# Patient Record
Sex: FEMALE | Race: WHITE | HISPANIC OR LATINO | Employment: FULL TIME | URBAN - METROPOLITAN AREA
[De-identification: names, ages, dates, MRNs, and addresses within clinical notes are randomized per-mention and may not be internally consistent; named-entity substitution may affect disease eponyms.]

---

## 2018-01-26 NOTE — TELEPHONE ENCOUNTER
----- Message from Franchesca Vgiil sent at 1/26/2018  3:14 PM CST -----  [facility] a [facility] 40[facility]. Contact number [phone].

## 2018-01-27 RX ORDER — PANTOPRAZOLE SODIUM 40 MG/1
40 TABLET, DELAYED RELEASE ORAL DAILY
Qty: 30 TABLET | Refills: 3 | Status: SHIPPED | OUTPATIENT
Start: 2018-01-27 | End: 2018-06-03 | Stop reason: SDUPTHER

## 2018-06-01 ENCOUNTER — TELEPHONE (OUTPATIENT)
Dept: NEUROLOGY | Facility: HOSPITAL | Age: 53
End: 2018-06-01

## 2018-06-04 RX ORDER — PANTOPRAZOLE SODIUM 40 MG/1
40 TABLET, DELAYED RELEASE ORAL DAILY
Qty: 90 TABLET | Refills: 3 | Status: SHIPPED | OUTPATIENT
Start: 2018-06-04 | End: 2020-05-18

## 2018-06-04 RX ORDER — PANTOPRAZOLE SODIUM 40 MG/1
40 TABLET, DELAYED RELEASE ORAL DAILY
Qty: 30 TABLET | Refills: 3 | Status: SHIPPED | OUTPATIENT
Start: 2018-06-04 | End: 2019-06-04

## 2020-05-18 RX ORDER — PANTOPRAZOLE SODIUM 40 MG/1
TABLET, DELAYED RELEASE ORAL
Qty: 90 TABLET | Refills: 3 | Status: SHIPPED | OUTPATIENT
Start: 2020-05-18 | End: 2021-08-09

## 2020-09-01 ENCOUNTER — CLINICAL SUPPORT (OUTPATIENT)
Dept: URGENT CARE | Facility: CLINIC | Age: 55
End: 2020-09-01
Payer: COMMERCIAL

## 2020-09-01 DIAGNOSIS — Z78.9 NO KNOWN PROBLEMS: Primary | ICD-10-CM

## 2020-09-01 LAB
CTP QC/QA: YES
SARS-COV-2 RDRP RESP QL NAA+PROBE: NEGATIVE

## 2020-09-01 PROCEDURE — U0002 COVID-19 LAB TEST NON-CDC: HCPCS | Mod: S$GLB,,, | Performed by: FAMILY MEDICINE

## 2020-09-01 PROCEDURE — 99201 PR OFFICE/OUTPT VISIT,NEW,LEVL I: ICD-10-PCS | Mod: S$GLB,,, | Performed by: FAMILY MEDICINE

## 2020-09-01 PROCEDURE — U0002: ICD-10-PCS | Mod: S$GLB,,, | Performed by: FAMILY MEDICINE

## 2020-09-01 PROCEDURE — 99201 PR OFFICE/OUTPT VISIT,NEW,LEVL I: CPT | Mod: S$GLB,,, | Performed by: FAMILY MEDICINE

## 2021-04-16 ENCOUNTER — PATIENT MESSAGE (OUTPATIENT)
Dept: RESEARCH | Facility: HOSPITAL | Age: 56
End: 2021-04-16

## 2021-05-26 ENCOUNTER — CLINICAL SUPPORT (OUTPATIENT)
Dept: URGENT CARE | Facility: CLINIC | Age: 56
End: 2021-05-26
Payer: COMMERCIAL

## 2021-05-26 DIAGNOSIS — Z11.52 ENCOUNTER FOR SCREENING LABORATORY TESTING FOR COVID-19 VIRUS IN ASYMPTOMATIC PATIENT: Primary | ICD-10-CM

## 2021-05-26 DIAGNOSIS — Z01.812 ENCOUNTER FOR SCREENING LABORATORY TESTING FOR COVID-19 VIRUS IN ASYMPTOMATIC PATIENT: Primary | ICD-10-CM

## 2021-05-26 LAB
CTP QC/QA: YES
SARS-COV-2 RDRP RESP QL NAA+PROBE: NEGATIVE

## 2021-05-26 PROCEDURE — U0002: ICD-10-PCS | Mod: QW,S$GLB,, | Performed by: NURSE PRACTITIONER

## 2021-05-26 PROCEDURE — U0002 COVID-19 LAB TEST NON-CDC: HCPCS | Mod: QW,S$GLB,, | Performed by: NURSE PRACTITIONER

## 2022-11-06 ENCOUNTER — OFFICE VISIT (OUTPATIENT)
Dept: URGENT CARE | Facility: CLINIC | Age: 57
End: 2022-11-06
Payer: COMMERCIAL

## 2022-11-06 VITALS
HEIGHT: 64 IN | SYSTOLIC BLOOD PRESSURE: 102 MMHG | HEART RATE: 68 BPM | RESPIRATION RATE: 20 BRPM | BODY MASS INDEX: 22.53 KG/M2 | DIASTOLIC BLOOD PRESSURE: 64 MMHG | TEMPERATURE: 99 F | WEIGHT: 132 LBS | OXYGEN SATURATION: 95 %

## 2022-11-06 DIAGNOSIS — R05.9 COUGH, UNSPECIFIED TYPE: Primary | ICD-10-CM

## 2022-11-06 DIAGNOSIS — U07.1 LAB TEST POSITIVE FOR DETECTION OF COVID-19 VIRUS: ICD-10-CM

## 2022-11-06 LAB
CTP QC/QA: YES
SARS-COV-2 AG RESP QL IA.RAPID: POSITIVE

## 2022-11-06 PROCEDURE — 87811 SARS-COV-2 COVID19 W/OPTIC: CPT | Mod: QW,S$GLB,, | Performed by: FAMILY MEDICINE

## 2022-11-06 PROCEDURE — 1159F MED LIST DOCD IN RCRD: CPT | Mod: CPTII,S$GLB,, | Performed by: FAMILY MEDICINE

## 2022-11-06 PROCEDURE — 3074F PR MOST RECENT SYSTOLIC BLOOD PRESSURE < 130 MM HG: ICD-10-PCS | Mod: CPTII,S$GLB,, | Performed by: FAMILY MEDICINE

## 2022-11-06 PROCEDURE — 99203 OFFICE O/P NEW LOW 30 MIN: CPT | Mod: S$GLB,,, | Performed by: FAMILY MEDICINE

## 2022-11-06 PROCEDURE — 3078F PR MOST RECENT DIASTOLIC BLOOD PRESSURE < 80 MM HG: ICD-10-PCS | Mod: CPTII,S$GLB,, | Performed by: FAMILY MEDICINE

## 2022-11-06 PROCEDURE — 1159F PR MEDICATION LIST DOCUMENTED IN MEDICAL RECORD: ICD-10-PCS | Mod: CPTII,S$GLB,, | Performed by: FAMILY MEDICINE

## 2022-11-06 PROCEDURE — 99203 PR OFFICE/OUTPT VISIT, NEW, LEVL III, 30-44 MIN: ICD-10-PCS | Mod: S$GLB,,, | Performed by: FAMILY MEDICINE

## 2022-11-06 PROCEDURE — 3008F BODY MASS INDEX DOCD: CPT | Mod: CPTII,S$GLB,, | Performed by: FAMILY MEDICINE

## 2022-11-06 PROCEDURE — 87811 SARS CORONAVIRUS 2 ANTIGEN POCT, MANUAL READ: ICD-10-PCS | Mod: QW,S$GLB,, | Performed by: FAMILY MEDICINE

## 2022-11-06 PROCEDURE — 3008F PR BODY MASS INDEX (BMI) DOCUMENTED: ICD-10-PCS | Mod: CPTII,S$GLB,, | Performed by: FAMILY MEDICINE

## 2022-11-06 PROCEDURE — 3074F SYST BP LT 130 MM HG: CPT | Mod: CPTII,S$GLB,, | Performed by: FAMILY MEDICINE

## 2022-11-06 PROCEDURE — 3078F DIAST BP <80 MM HG: CPT | Mod: CPTII,S$GLB,, | Performed by: FAMILY MEDICINE

## 2022-11-06 RX ORDER — BENZONATATE 100 MG/1
200 CAPSULE ORAL 3 TIMES DAILY PRN
Qty: 30 CAPSULE | Refills: 1 | Status: SHIPPED | OUTPATIENT
Start: 2022-11-06

## 2022-11-06 RX ORDER — LORATADINE 10 MG/1
10 TABLET ORAL DAILY
Qty: 30 TABLET | Refills: 2 | Status: SHIPPED | OUTPATIENT
Start: 2022-11-06 | End: 2023-11-06

## 2022-11-06 NOTE — PATIENT INSTRUCTIONS
Your test was POSITIVE for COVID-19 (coronavirus).       Please isolate yourself at home.  You may leave home and/or return to work once the following conditions are met:    If you were not hospitalized and are not moderately to severely immunocompromised:   More than 5 days since symptoms first appeared AND  More than 24 hours fever free without medications AND  Symptoms are improving  Continue to wear a mask around others for 5 additional days.    If you were hospitalized OR are moderately to severely immunocompromised:  More than 20 days since symptoms first appeared  More than 24 hours fever free without medications  Symptoms have improved    If you had no symptoms but tested positive:  More than 5 days since the date of the first positive test (20 days if moderately to severely immunocompromised). If you develop symptoms, then use the guidelines above.  Continue to wear a mask around others for 5 additional days.      Contact Tracing    As one of the next steps, you will receive a call or text from the [place] Department of Health ([facility]) COVID-19 Tracing Team. See the contact information below so you know not to ignore the health departments call. It is important that you contact them back immediately so they can help.      Contact Tracer Number:  [phone]  Caller ID for most carriers: [facility]     What is contact tracing?  Contact tracing is a process that helps identify everyone who has been in close contact with an infected person. Contact tracers let those people know they may have been exposed and guide them on next steps. Confidentiality is important for everyone; no one will be told who may have exposed them to the virus.  Your involvement is important. The more we know about where and how this virus is spreading, the better chance we have at stopping it from spreading further.  What does exposure mean?  Exposure means you have been within 6 feet for more than 15 minutes with a person who  has or had COVID-19.  What kind of questions do the contact tracers ask?  A contact tracer will confirm your basic contact information including name, address, phone number, and next of kin, as well as asking about any symptoms you may have had. Theyll also ask you how you think you may have gotten sick, such as places where you may have been exposed to the virus, and people you were with. Those names will never be shared with anyone outside of that call, and will only be used to help trace and stop the spread of the virus.   I have privacy concerns. How will the state use my information?  Your privacy about your health is important. All calls are completed using call centers that use the appropriate health privacy protection measures (HIPAA compliance), meaning that your patient information is safe. No one will ever ask you any questions related to immigration status. Your health comes first.   Do I have to participate?  You do not have to participate, but we strongly encourage you to. Contact tracing can help us catch and control new outbreaks as theyre developing to keep your friends and family safe.   What if I dont hear from anyone?  If you dont receive a call within 24 hours, you can call the number above right away to inquire about your status. That line is open from 8:00 am - 8:00 p.m., 7 days a week.  Contact tracing saves lives! Together, we have the power to beat this virus and keep our loved ones and neighbors safe.    For more information see CDC link below.      https://www.cdc.gov/coronavirus/2019-ncov/hcp/guidance-prevent-spread.html#precautions        Sources:  [facility], [place] [facility] and [facility]           Sincerely,     Gabe Sommer MD

## 2022-11-06 NOTE — PROGRESS NOTES
"Subjective:       Patient ID: Zaynab Jerome is a 57 y.o. female.    Vitals:  height is 5' 4" (1.626 m) and weight is 59.9 kg (132 lb). Her tympanic temperature is 98.5 °F (36.9 °C). Her blood pressure is 102/64 and her pulse is 68. Her respiration is 20 and oxygen saturation is 95%.     Chief Complaint: Cough (Sore throat, headache, body aches, fever)    This is a 57 y.o. female who presents today with a chief complaint of cough, sore throat,nasal congestion , headaches, that fever, that started on Tuesday and since have gotten worst. Pt explain that she sued OTC cough and pain med's but no relief, and she tested positive for Covid on Wednesday.      Cough  This is a new problem. The current episode started in the past 7 days. The problem has been gradually worsening. The problem occurs every few minutes. The cough is Non-productive. Associated symptoms include chills, a fever, headaches, nasal congestion, postnasal drip and a sore throat. Nothing aggravates the symptoms. She has tried OTC cough suppressant for the symptoms. The treatment provided no relief.     Constitution: Positive for chills and fever.   HENT:  Positive for postnasal drip and sore throat.    Respiratory:  Positive for cough.    Neurological:  Positive for headaches.     Objective:      Physical Exam   Constitutional: She is oriented to person, place, and time. She appears well-developed. She is cooperative.  Non-toxic appearance. She does not appear ill. No distress.   HENT:   Head: Normocephalic and atraumatic.   Ears:   Right Ear: Hearing, tympanic membrane, external ear and ear canal normal.   Left Ear: Hearing, tympanic membrane, external ear and ear canal normal.   Nose: Nose normal. No mucosal edema, rhinorrhea or nasal deformity. No epistaxis. Right sinus exhibits no maxillary sinus tenderness and no frontal sinus tenderness. Left sinus exhibits no maxillary sinus tenderness and no frontal sinus tenderness.   Mouth/Throat: Uvula is " midline, oropharynx is clear and moist and mucous membranes are normal. Mucous membranes are moist. No trismus in the jaw. Normal dentition. No uvula swelling. No oropharyngeal exudate. Oropharynx is clear.   Eyes: Conjunctivae and lids are normal. Right eye exhibits no discharge. Left eye exhibits no discharge. No scleral icterus. Extraocular movement intact   Neck: Trachea normal and phonation normal. Neck supple.   Cardiovascular: Normal rate, regular rhythm, normal heart sounds and normal pulses.   Pulmonary/Chest: Effort normal and breath sounds normal. No respiratory distress.   Abdominal: Normal appearance and bowel sounds are normal. She exhibits no distension and no mass. Soft. There is no abdominal tenderness.   Musculoskeletal: Normal range of motion.         General: No deformity. Normal range of motion.   Neurological: She is alert and oriented to person, place, and time. She exhibits normal muscle tone. Coordination normal.   Skin: Skin is warm, dry, intact, not diaphoretic and not pale.   Psychiatric: Her speech is normal and behavior is normal. Judgment and thought content normal.   Nursing note and vitals reviewed.      Assessment:       No diagnosis found.      Plan:         There are no diagnoses linked to this encounter.

## 2023-04-13 ENCOUNTER — HOSPITAL ENCOUNTER (EMERGENCY)
Facility: OTHER | Age: 58
Discharge: HOME | End: 2023-04-13
Attending: EMERGENCY MEDICINE
Payer: COMMERCIAL

## 2023-04-13 VITALS
BODY MASS INDEX: 22.2 KG/M2 | OXYGEN SATURATION: 100 % | SYSTOLIC BLOOD PRESSURE: 125 MMHG | WEIGHT: 130 LBS | HEART RATE: 61 BPM | RESPIRATION RATE: 19 BRPM | HEIGHT: 64 IN | DIASTOLIC BLOOD PRESSURE: 61 MMHG | TEMPERATURE: 98 F

## 2023-04-13 DIAGNOSIS — R10.9 LEFT FLANK PAIN: Primary | ICD-10-CM

## 2023-04-13 DIAGNOSIS — M62.838 MUSCLE SPASM: ICD-10-CM

## 2023-04-13 LAB
ALBUMIN SERPL BCP-MCNC: 4.3 G/DL (ref 3.5–5.2)
ALP SERPL-CCNC: 98 U/L (ref 55–135)
ALT SERPL W/O P-5'-P-CCNC: 21 U/L (ref 10–44)
ANION GAP SERPL CALC-SCNC: 9 MMOL/L (ref 8–16)
AST SERPL-CCNC: 19 U/L (ref 10–40)
BASOPHILS # BLD AUTO: 0.02 K/UL (ref 0–0.2)
BASOPHILS NFR BLD: 0.3 % (ref 0–1.9)
BILIRUB SERPL-MCNC: 0.2 MG/DL (ref 0.1–1)
BILIRUB UR QL STRIP: NEGATIVE
BUN SERPL-MCNC: 15 MG/DL (ref 6–20)
CALCIUM SERPL-MCNC: 10.2 MG/DL (ref 8.7–10.5)
CHLORIDE SERPL-SCNC: 104 MMOL/L (ref 95–110)
CLARITY UR: CLEAR
CO2 SERPL-SCNC: 28 MMOL/L (ref 23–29)
COLOR UR: YELLOW
CREAT SERPL-MCNC: 0.9 MG/DL (ref 0.5–1.4)
DIFFERENTIAL METHOD: NORMAL
EOSINOPHIL # BLD AUTO: 0.1 K/UL (ref 0–0.5)
EOSINOPHIL NFR BLD: 1.4 % (ref 0–8)
ERYTHROCYTE [DISTWIDTH] IN BLOOD BY AUTOMATED COUNT: 13.5 % (ref 11.5–14.5)
EST. GFR  (NO RACE VARIABLE): >60 ML/MIN/1.73 M^2
GLUCOSE SERPL-MCNC: 68 MG/DL (ref 70–110)
GLUCOSE UR QL STRIP: NEGATIVE
HCT VFR BLD AUTO: 43.6 % (ref 37–48.5)
HCV AB SERPL QL IA: NEGATIVE
HGB BLD-MCNC: 14 G/DL (ref 12–16)
HGB UR QL STRIP: NEGATIVE
HIV 1+2 AB+HIV1 P24 AG SERPL QL IA: NEGATIVE
IMM GRANULOCYTES # BLD AUTO: 0.01 K/UL (ref 0–0.04)
IMM GRANULOCYTES NFR BLD AUTO: 0.2 % (ref 0–0.5)
KETONES UR QL STRIP: NEGATIVE
LEUKOCYTE ESTERASE UR QL STRIP: NEGATIVE
LYMPHOCYTES # BLD AUTO: 1.9 K/UL (ref 1–4.8)
LYMPHOCYTES NFR BLD: 32.7 % (ref 18–48)
MCH RBC QN AUTO: 29.4 PG (ref 27–31)
MCHC RBC AUTO-ENTMCNC: 32.1 G/DL (ref 32–36)
MCV RBC AUTO: 92 FL (ref 82–98)
MONOCYTES # BLD AUTO: 0.6 K/UL (ref 0.3–1)
MONOCYTES NFR BLD: 10.8 % (ref 4–15)
NEUTROPHILS # BLD AUTO: 3.1 K/UL (ref 1.8–7.7)
NEUTROPHILS NFR BLD: 54.6 % (ref 38–73)
NITRITE UR QL STRIP: NEGATIVE
NRBC BLD-RTO: 0 /100 WBC
PH UR STRIP: 6 [PH] (ref 5–8)
PLATELET # BLD AUTO: 245 K/UL (ref 150–450)
PMV BLD AUTO: 10.7 FL (ref 9.2–12.9)
POTASSIUM SERPL-SCNC: 3.9 MMOL/L (ref 3.5–5.1)
PROT SERPL-MCNC: 8.2 G/DL (ref 6–8.4)
PROT UR QL STRIP: NEGATIVE
RBC # BLD AUTO: 4.76 M/UL (ref 4–5.4)
SODIUM SERPL-SCNC: 141 MMOL/L (ref 136–145)
SP GR UR STRIP: 1.01 (ref 1–1.03)
URN SPEC COLLECT METH UR: NORMAL
UROBILINOGEN UR STRIP-ACNC: NEGATIVE EU/DL
WBC # BLD AUTO: 5.75 K/UL (ref 3.9–12.7)

## 2023-04-13 PROCEDURE — 87389 HIV-1 AG W/HIV-1&-2 AB AG IA: CPT | Performed by: EMERGENCY MEDICINE

## 2023-04-13 PROCEDURE — 99285 EMERGENCY DEPT VISIT HI MDM: CPT | Mod: 25

## 2023-04-13 PROCEDURE — 85025 COMPLETE CBC W/AUTO DIFF WBC: CPT | Performed by: NURSE PRACTITIONER

## 2023-04-13 PROCEDURE — 96374 THER/PROPH/DIAG INJ IV PUSH: CPT

## 2023-04-13 PROCEDURE — 80053 COMPREHEN METABOLIC PANEL: CPT | Performed by: NURSE PRACTITIONER

## 2023-04-13 PROCEDURE — 25000003 PHARM REV CODE 250: Performed by: EMERGENCY MEDICINE

## 2023-04-13 PROCEDURE — 81003 URINALYSIS AUTO W/O SCOPE: CPT | Performed by: NURSE PRACTITIONER

## 2023-04-13 PROCEDURE — 96375 TX/PRO/DX INJ NEW DRUG ADDON: CPT

## 2023-04-13 PROCEDURE — 63600175 PHARM REV CODE 636 W HCPCS: Performed by: EMERGENCY MEDICINE

## 2023-04-13 PROCEDURE — 86803 HEPATITIS C AB TEST: CPT | Performed by: EMERGENCY MEDICINE

## 2023-04-13 RX ORDER — DEXAMETHASONE SODIUM PHOSPHATE 4 MG/ML
8 INJECTION, SOLUTION INTRA-ARTICULAR; INTRALESIONAL; INTRAMUSCULAR; INTRAVENOUS; SOFT TISSUE
Status: COMPLETED | OUTPATIENT
Start: 2023-04-13 | End: 2023-04-13

## 2023-04-13 RX ORDER — DIAZEPAM 5 MG/1
5 TABLET ORAL ONCE
Status: COMPLETED | OUTPATIENT
Start: 2023-04-13 | End: 2023-04-13

## 2023-04-13 RX ORDER — LIDOCAINE 50 MG/G
1 PATCH TOPICAL
Status: DISCONTINUED | OUTPATIENT
Start: 2023-04-13 | End: 2023-04-13 | Stop reason: HOSPADM

## 2023-04-13 RX ORDER — KETOROLAC TROMETHAMINE 30 MG/ML
15 INJECTION, SOLUTION INTRAMUSCULAR; INTRAVENOUS
Status: COMPLETED | OUTPATIENT
Start: 2023-04-13 | End: 2023-04-13

## 2023-04-13 RX ORDER — ACETAMINOPHEN 500 MG
1000 TABLET ORAL
Status: COMPLETED | OUTPATIENT
Start: 2023-04-13 | End: 2023-04-13

## 2023-04-13 RX ORDER — METHOCARBAMOL 500 MG/1
1000 TABLET, FILM COATED ORAL 3 TIMES DAILY
Qty: 30 TABLET | Refills: 0 | Status: SHIPPED | OUTPATIENT
Start: 2023-04-13 | End: 2023-04-18

## 2023-04-13 RX ORDER — MORPHINE SULFATE 4 MG/ML
4 INJECTION, SOLUTION INTRAMUSCULAR; INTRAVENOUS
Status: COMPLETED | OUTPATIENT
Start: 2023-04-13 | End: 2023-04-13

## 2023-04-13 RX ORDER — LIDOCAINE 50 MG/G
1 PATCH TOPICAL DAILY
Qty: 5 PATCH | Refills: 0 | Status: SHIPPED | OUTPATIENT
Start: 2023-04-13

## 2023-04-13 RX ADMIN — DIAZEPAM 5 MG: 5 TABLET ORAL at 03:04

## 2023-04-13 RX ADMIN — DEXAMETHASONE SODIUM PHOSPHATE 8 MG: 4 INJECTION, SOLUTION INTRA-ARTICULAR; INTRALESIONAL; INTRAMUSCULAR; INTRAVENOUS; SOFT TISSUE at 01:04

## 2023-04-13 RX ADMIN — KETOROLAC TROMETHAMINE 15 MG: 30 INJECTION, SOLUTION INTRAMUSCULAR; INTRAVENOUS at 12:04

## 2023-04-13 RX ADMIN — MORPHINE SULFATE 4 MG: 4 INJECTION, SOLUTION INTRAMUSCULAR; INTRAVENOUS at 12:04

## 2023-04-13 RX ADMIN — ACETAMINOPHEN 1000 MG: 500 TABLET, FILM COATED ORAL at 01:04

## 2023-04-13 RX ADMIN — LIDOCAINE 5% 1 PATCH: 700 PATCH TOPICAL at 01:04

## 2023-04-13 NOTE — ED TRIAGE NOTES
Pt reports to ED with sudden left sided flank pain that started earlier when she was driving. Pt denies urinary symptoms, nausea, or vomiting. 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 Aaox4.

## 2023-04-13 NOTE — ED PROVIDER NOTES
"Encounter Date: 4/13/2023    SCRIBE #1 NOTE: I, Veronica Powers, am scribing for, and in the presence of,  Isela Rogers MD.     History     Chief Complaint   Patient presents with    Flank Pain     Reports sudden L flank pain today while driving, states pain is coming in waves. Denies dysuria, hemturia, fevers, abd pain, nausea. Also reports episode of dizziness two days ago. Hx of back pain, "tumor on L side where the pain is today".      Time seen by provider: 12:10 PM    Zaynab Jerome is a 58 y.o. female who presents to the ED with severe left flank pain that began three days ago. The patient reports that the pain is intermittent, worse with movement, and feels like "spasms." She notes that she has a history of lower back arthritis but that type of pain has never felt this severe, and she has a follow up MRI scheduled for five days from today. The patient reports experiencing nausea three days ago, which has since resolved, and denies any urinary abnormalities. The patient states she took Advil for her pain today without relief. She denies recent heavy lifting. She denies having a kidney stone in the past. This is the extent of the patient's complaints today in the Emergency Department.       Review of patient's allergies indicates:  No Known Allergies  Past Medical History:   Diagnosis Date    Anxiety state, unspecified     Endocarditis, valve unspecified, unspecified cause     Unspecified asthma(493.90)      Past Surgical History:   Procedure Laterality Date    HYSTERECTOMY  2005    partial     History reviewed. No pertinent family history.  Social History     Tobacco Use    Smoking status: Never     Passive exposure: Never    Smokeless tobacco: Never   Substance Use Topics    Alcohol use: Yes     Comment: socially     Review of Systems   Constitutional:  Negative for activity change, appetite change, chills, diaphoresis and fever.   HENT:  Negative for congestion, sore throat and trouble " swallowing.    Eyes:  Negative for photophobia and visual disturbance.   Respiratory:  Negative for cough, chest tightness and shortness of breath.    Cardiovascular:  Negative for chest pain.   Gastrointestinal:  Positive for nausea (resolved). Negative for abdominal pain and vomiting.   Endocrine: Negative for polydipsia and polyuria.   Genitourinary:  Positive for flank pain. Negative for difficulty urinating, dysuria, frequency and hematuria.   Musculoskeletal:  Positive for back pain. Negative for neck pain.   Skin:  Negative for rash.   Neurological:  Negative for weakness and headaches.   Psychiatric/Behavioral:  Negative for confusion.      Physical Exam     Initial Vitals [04/13/23 1133]   BP Pulse Resp Temp SpO2   128/60 65 18 98.2 °F (36.8 °C) 100 %      MAP       --         Physical Exam    Nursing note and vitals reviewed.  Constitutional: She appears well-developed. She is cooperative.   Appears uncomfortable. Holding left flank.   HENT:   Head: Atraumatic.   Eyes: Conjunctivae and lids are normal.   Neck:   Normal range of motion.  Cardiovascular:  Normal rate.           Musculoskeletal:         General: Normal range of motion.      Cervical back: Normal range of motion.      Comments: Spasm present to left thoracic region.     Neurological: She is alert.   Skin: No rash noted.   Psychiatric: She has a normal mood and affect. Her speech is normal and behavior is normal.       ED Course   Procedures  Labs Reviewed   COMPREHENSIVE METABOLIC PANEL - Abnormal; Notable for the following components:       Result Value    Glucose 68 (*)     All other components within normal limits   CBC W/ AUTO DIFFERENTIAL   URINALYSIS, REFLEX TO URINE CULTURE    Narrative:     Specimen Source->Urine   HIV 1 / 2 ANTIBODY    Narrative:     Release to patient->Immediate   HEPATITIS C ANTIBODY    Narrative:     Release to patient->Immediate          Imaging Results              CT Renal Stone Study ABD Pelvis WO (Final  result)  Result time 04/13/23 12:38:06      Final result by Flip Malone III, MD (04/13/23 12:38:06)                   Impression:      No acute process seen.      Electronically signed by: Flip Malone MD  Date:    04/13/2023  Time:    12:38               Narrative:    EXAMINATION:  CT RENAL STONE STUDY ABD PELVIS WO    CLINICAL HISTORY:  Flank pain, kidney stone suspected;    FINDINGS:  The lungs are clear.  Gallbladder has been removed.  Liver, spleen, stomach, pancreas, and duodenum show nothing unusual.  The adrenal glands are not enlarged.  No hydronephrosis is seen.  Ureters are normal in course and caliber.  No bladder calculi are seen.  The appendix is normal.  No obstruction, ileus, perforation, or ascites is seen.  Bowel loops are normal.  No para-aortic, retroperitoneal, or mesenteric adenopathy seen.  Bones demonstrate nothing unusual.                                       Medications   LIDOcaine 5 % patch 1 patch (1 patch Transdermal Patch Applied 4/13/23 1324)   ketorolac injection 15 mg (15 mg Intravenous Given 4/13/23 1219)   morphine injection 4 mg (4 mg Intravenous Given 4/13/23 1218)   dexAMETHasone injection 8 mg (8 mg Intravenous Given 4/13/23 1320)   acetaminophen tablet 1,000 mg (1,000 mg Oral Given 4/13/23 1351)     Medical Decision Making:   History:   Old Medical Records: I decided to obtain old medical records.  Clinical Tests:   Lab Tests: Ordered and Reviewed        Scribe Attestation:   Scribe #1: I performed the above scribed service and the documentation accurately describes the services I performed. I attest to the accuracy of the note.      ED Course as of 04/13/23 1630   Thu Apr 13, 2023   1211 57 yo F here with L upper/mid back pain, severe and intermittent since 9 am. No hx of injury/heavy lifting or kidney stones in past. Suspect kidney stone v pyelo, muscle spasm. Will obtain labs, imaging, provide analgesia and reassess.  [DM]   1319 CT Renal Stone Study ABD Pelvis  WO  Discussed results with pt, awaiting steroids, reproducible L paravertebral spasm present to lower thoracic region [DM]   1533 Pt reassessed given persistent pain. Offered trigger pt injection though declined. Will admin muscle relaxant and dc home with pt. [DM]      ED Course User Index  [DM] Isela Rogers MD            Physician Attestation for Scribe: ISue, reviewed documentation as scribed in my presence, which is both accurate and complete.       Clinical Impression:   Final diagnoses:  [R10.9] Left flank pain (Primary)  [M62.838] Muscle spasm               Isela Rogers MD  04/13/23 1356       Isela Rogers MD  04/13/23 1630

## 2023-04-17 ENCOUNTER — NUTRITION (OUTPATIENT)
Dept: NUTRITION | Facility: CLINIC | Age: 58
End: 2023-04-17
Payer: COMMERCIAL

## 2023-04-17 VITALS — WEIGHT: 140.56 LBS | HEIGHT: 64 IN | BODY MASS INDEX: 24 KG/M2

## 2023-04-17 DIAGNOSIS — Z71.3 DIETARY COUNSELING: ICD-10-CM

## 2023-04-17 DIAGNOSIS — E78.5 DYSLIPIDEMIA: Primary | ICD-10-CM

## 2023-04-17 PROCEDURE — 99999 PR PBB SHADOW E&M-EST. PATIENT-LVL III: ICD-10-PCS | Mod: PBBFAC,,,

## 2023-04-17 PROCEDURE — 99999 PR PBB SHADOW E&M-EST. PATIENT-LVL III: CPT | Mod: PBBFAC,,,

## 2023-04-17 NOTE — PROGRESS NOTES
"Referring Physician:Reynaldo Thomas MD     Reason for visit:  Chief Complaint   Patient presents with    Hyperlipidemia    Nutrition Counseling      Initial Visit    :1965     Allergies Reviewed  Meds Reviewed    Anthropometrics  Weight:63.7 kg (140 lb 8.7 oz)  Height:5' 4" (1.626 m)  BMI:Body mass index is 24.12 kg/m².   IBW: 54.5 kg  +/-10%    Meds:  Outpatient Medications Prior to Visit   Medication Sig Dispense Refill    alendronate (FOSAMAX) 70 MG tablet Take 70 mg by mouth every 30 days.      benzonatate (TESSALON PERLES) 100 MG capsule Take 2 capsules (200 mg total) by mouth 3 (three) times daily as needed for Cough. 30 capsule 1    estradioL (DIVIGEL) 1 mg/gram (0.1 %) topical gel Place 0.25 g onto the skin as needed.       ibandronate (BONIVA) 150 mg tablet       LIDOcaine (LIDODERM) 5 % Place 1 patch onto the skin once daily. Remove & Discard patch within 12 hours or as directed by MD 5 patch 0    loratadine (CLARITIN) 10 mg tablet Take 1 tablet (10 mg total) by mouth once daily. 30 tablet 2    methocarbamoL (ROBAXIN) 500 MG Tab Take 2 tablets (1,000 mg total) by mouth 3 (three) times daily. for 5 days 30 tablet 0    multivitamin (THERAGRAN) per tablet Take 1 tablet by mouth once daily.      pantoprazole (PROTONIX) 40 MG tablet TAKE 1 TABLET BY MOUTH EVERY DAY 90 tablet 3     No facility-administered medications prior to visit.       Food/Drug Interactions Noted:  n/a    Vitamins/Supplements/Herbs:  MVI    Labs: 2023       Nutrition Prescription: 1635 Kcals/day( 30 kcal/kg IBW),  55 g protein( 1.0 g/kg IBW)     Support System:  pt does the grocery shopping and cooking.    Diet Hx:  pt states she has been fasting for weight maintenance; currently has out of town company and has been eating regular meal.  Drinks wine at night.  Tries to follow a "healthy" diet.  Snacks:  dried fruits; all kinds of salty, healthy chips.  Uses only olive or avocado oil.  Pt does not wish to take medication " for elevated LDL Chol    Breakfast: coffee with flavored creamer and bread.  Lunch:  varies:  salmon or salads.  Water or regular soda  Dinner:   last night:  lean ground beef hamburger with sauteed onions, sweet potato souffle', red wine.    Current activity level and/or physical limitations:  no exercise    Motivation to make changes/anticipated barriers and/or expected adherence:  no barriers to adherence identified    Nutrition-Focus Physical Findings:   pt appears well nourished    Assessment:  pt attentive during discussion of foods recommended and to avoid, reading food labels, importance of exercise.  Her questions were answered, and she verbalized understanding of information.    Nutrition Diagnosis:  Food- and nutrition-related knowledge deficit RT lack of prior exposure to information AEB dx dyslipidemia      Recommendations:  Mediterranean diet. Exercise goal: 30 minutes per day, 3-5 days per week.  Handouts provided & reviewed:  Heart-Healthy Eating Mediterranean Style; Mediterranean Diet Shopping List; Reading a Food Label; Lifestyle Tips to Lower your LDL Cholesterol      Strategies Implemented:   increase physical activity    Consultation Time:15 minutes.  Communicated with referring healthcare provider:  Consult note available in pt's Epic chart per MD discretion  Follow Up:  Pt provided with dietitian contact number and advised to call with questions or make future appointment if further intervention needed.

## 2023-05-24 ENCOUNTER — CLINICAL SUPPORT (OUTPATIENT)
Dept: REHABILITATION | Facility: HOSPITAL | Age: 58
End: 2023-05-24
Payer: COMMERCIAL

## 2023-05-24 DIAGNOSIS — M25.69 DECREASED RANGE OF MOTION OF TRUNK AND BACK: ICD-10-CM

## 2023-05-24 DIAGNOSIS — R29.898 WEAKNESS OF BOTH HIPS: ICD-10-CM

## 2023-05-24 PROCEDURE — 97161 PT EVAL LOW COMPLEX 20 MIN: CPT

## 2023-05-24 PROCEDURE — 97110 THERAPEUTIC EXERCISES: CPT

## 2023-05-30 ENCOUNTER — CLINICAL SUPPORT (OUTPATIENT)
Dept: REHABILITATION | Facility: HOSPITAL | Age: 58
End: 2023-05-30
Payer: COMMERCIAL

## 2023-05-30 DIAGNOSIS — R29.898 WEAKNESS OF BOTH HIPS: ICD-10-CM

## 2023-05-30 DIAGNOSIS — M25.69 DECREASED RANGE OF MOTION OF TRUNK AND BACK: Primary | ICD-10-CM

## 2023-05-30 PROCEDURE — 97110 THERAPEUTIC EXERCISES: CPT

## 2023-05-30 PROCEDURE — 97112 NEUROMUSCULAR REEDUCATION: CPT

## 2023-05-30 NOTE — PROGRESS NOTES
"[facility]   Physical Therapy Treatment Note      Name: Zaynab Coyne [facility]  Clinic Number: [id number]    Therapy Diagnosis:   Encounter Diagnoses   Name Primary?    Decreased range of motion of trunk and back Yes    Weakness of both hips      Physician: Isela Rogers MD    Visit Date: 5/30/2023    Physician Orders: PT Eval and Treat   Medical Diagnosis from Referral: M62.838 (ICD-10-CM) - Muscle spasm  Evaluation Date: 5/24/2023  Authorization Period Expiration: 12/31/2023  Plan of Care Expiration: 8/24/2023  Progress Note Due: 6/24/2023  Visit # / Visits authorized: 2/ 15   FOTO: 1/ 3     PTA Visit #: 0/5     Time In: 3:50 PM  Time Out: 4:30 PM  Total Billable Time: 40 minutes     Precautions: Standard, Asthma    Subjective     Pt reports: minimal back pain today.    She was compliant with home exercise program.  Response to previous treatment: no adverse response  Functional change: none reported, ongoing    Pain: 2/10  Location: left mid back/flank     Objective      Objective Measures updated at progress report unless specified.     Treatment     Zaynab received the treatments listed below:      therapeutic exercises to develop strength and flexibility for 25 minutes including:    - thread the needle stretch 20" x 3 bilaterally  - lower trunk rotation stretch 10" x 10 bilaterally  - bridges with hip abduction with red band 2 x 10 reps  - sidelying clams with red band 2 x 12 reps  - prone press up x 10  - prone hip extension 2 x 10 reps      neuromuscular re-education activities to improve: Proprioception and Posture for 15 minutes. The following activities were included:  - posterior pelvic tilts 3" 2 x 10  - isometric hip adduction + transverse abdominal / PGM contraction 3" x 20  - isometric transverse abdominal contraction + alternating hooklying march 2 x 10 reps      Patient Education and Home Exercises       Education provided:   - purpose of exercises performed    Written " Home Exercises Provided: Patient instructed to cont prior HEP. Exercises were reviewed and Zaynab was able to demonstrate them prior to the end of the session.  Zaynab demonstrated good  understanding of the education provided. See EMR under Patient Instructions for exercises provided during therapy sessions    Assessment     Zaynab presents with mild left flank pain at start of session but was agreeable to session.  Pt demonstrated fair transverse abdominal contraction with tactile cuing, Rotation stretch for mid thoracic area subjectively improved pain.  Skilled PT remains appropriate to address impairments to met goals set at eval.     Zaynab Is progressing well towards her goals.   Pt prognosis is Good.     Pt will continue to benefit from skilled outpatient physical therapy to address the deficits listed in the problem list box on initial evaluation, provide pt/family education and to maximize pt's level of independence in the home and community environment.     Pt's spiritual, cultural and educational needs considered and pt agreeable to plan of care and goals.     Anticipated barriers to physical therapy: none    Goals:      SHORT TERM GOALS:  4 weeks 5/24/2023   Recent signs and systems trend is improving in order to progress towards LTG's. progressing    Patient will be independent with HEP in order to further progress and return to maximal function. progressing     Pain rating at Worst: 5/10 in order to progress towards increased independence with activity. progressing     Patient will be able to correct postural deviations in sitting and standing, to decrease pain and promote postural awareness for injury prevention.  progressing        LONG TERM GOALS: 8 weeks 5/24/2023   Patient will return to normal ADL, recreational, and work related activities with less pain and limitation.  progressing     Patient will improve AROM to stated goals in order to return to maximal functional potential.  progressing      Patient will improve Strength to stated goals of appropriate musculature in order to improve functional independence.  progressing     Pain Rating at Worst: 2/10 to improve Quality of Life.   progressing    Patient will meet predicted functional outcome (FOTO) score: 15% limitation or less to increase self-worth & perceived functional ability.  progressing    Patient will have met personal goal of: performing household chores without increase in back symptoms.   progressing         Plan     Progress core stabilization exercise as tolerated.    Venessa Brown, PT

## 2023-05-30 NOTE — PLAN OF CARE
[facility]   Physical Therapy Initial Evaluation     Date: 5/24/2023   Name: Zaynab Coyne [facility]  Clinic Number: [id number]    Therapy Diagnosis:   Encounter Diagnoses   Name Primary?    Decreased range of motion of trunk and back     Weakness of both hips      Physician: Isela Rogers MD    Physician Orders: PT Eval and Treat   Medical Diagnosis from Referral: M62.838 (ICD-10-CM) - Muscle spasm  Evaluation Date: 5/24/2023  Authorization Period Expiration: 12/31/2023  Plan of Care Expiration: 8/24/2023  Progress Note Due: 6/24/2023  Visit # / Visits authorized: 1/ 1   FOTO: 1/ 3     Precautions: Standard, Asthma     Time In: 2:05 PM  Time Out: 2:45 PM  Total Appointment Time (timed & untimed codes): 40 minutes    SUBJECTIVE   Date of onset: 6 months ago     History of current condition - Zaynab reports insidious onset of Left sided low back pain that began about 6 months ago. She describes pain as achy. She denies radicular symptoms. She states the pain doesn't limit her from her usual activities unless she is in a flare up. Some days the pain is better, but when she has flare ups the pain is severe. She states pain and stiffness are usually worse in the morning, it usually improves as the day progresses. Aggravating factors include bending and twisting. She reports history of B knee pain L > R.    Falls: no     Imaging: none    Prior Therapy: no   Social History: 1 story home   Occupation: executive for non-profit organization, mostly desk work   Prior Level of Function: Independent   Current Level of Function: Independent     Pain:  Current 2/10, worst 10/10, best 2/10   Location: Left low back   Description: Aching  Aggravating Factors: bending, twisting  Easing Factors: rest    Patients goals: decrease pain      Medical History:   Past Medical History:   Diagnosis Date    Anxiety state, unspecified     Endocarditis, valve unspecified, unspecified cause     Unspecified asthma(493.90)  "     Surgical History:   Zaynab Jerome  has a past surgical history that includes Hysterectomy (2005).    Medications:   Zaynab Coyne has a current medication list which includes the following prescription(s): alendronate, benzonatate, estradiol, ibandronate, lidocaine, loratadine, multivitamin, and pantoprazole.    Allergies:   Review of patient's allergies indicates:  No Known Allergies       OBJECTIVE     Posture:  slouched, rounded shoulders     Lumbar Range of Motion:    % Limitation Pain Goal   Flexion WNL   "Pulling"      Full and pain free   Extension Moderate loss          Full and pain free   Left Side Bending Minimal loss Pain end range      Full and pain free   Right Side Bending WNL       Full and pain free   Left rotation   Minimal loss Pain end range      Full and pain free   Right Rotation   WNL        Full and pain free      Hip Range of Motion:   Right  Left Goal   Hip External Rotation (hip flexed to 90) WNL WNL 45 deg.   Hip Internal Rotation (hip flexed to 90) WNL WNL 45 deg.   Hip Flexion WNL  deg.       Lower Extremity Strength  Right LE  Left LE  Goal   Knee extension: 5/5 Knee extension: 5/5 5/5   Knee flexion: 5/5 Knee flexion: 5/5 5/5   Hip flexion: 4+/5 Hip flexion: 4+/5 5/5   Hip extension:  4+/5 Hip extension: 4+/5 5/5   Hip abduction: 4/5 Hip abduction: 4/5 5/5       Neuro Dynamic Testing:    Sciatic nerve:      SLR: R = Negative     L = Negative        Flexibility:   Mild tightness bilateral hamstrings   Ely's test: R = Negative ; L = Negative      Limitation/Restriction for FOTO Back Survey    Therapist reviewed FOTO scores for Zaynab Jerome on 5/24/2023.   FOTO documents entered into [employer] - see Media section.    Limitation Score: 18%         TREATMENT     Total Treatment time (time-based codes) separate from Evaluation: 10 minutes     Zaynab received the treatments listed below:      THERAPEUTIC EXERCISES to develop  strength and ROM for 10 minutes including: "     Education HEP  LTR  PPT  Bridges  SL abduction      PATIENT EDUCATION AND HOME EXERCISES   Education provided:   Role of Physical Therapist  Physical Therapy Plan Of Care  HEP    Written Home Exercises Provided: yes.  Exercises were reviewed and Zaynab was able to demonstrate them prior to the end of the session.  Zaynab demonstrated good  understanding of the education provided.     See EMR under Patient Instructions for exercises provided 5/24/2023    ASSESSMENT   Zaynab Coyne is a 58 y.o. female referred to outpatient Physical Therapy with a medical diagnosis of M62.838 (ICD-10-CM) - Muscle spasm. Upon physical assessment, patient demonstrates decreased lumbar range of motion, bilateral hip weakness, and poor posture. Patient prognosis is Good. Patient will benefit from skilled outpatient Physical Therapy to address the deficits stated above and in the chart below, provide patient education, and to maximize patient's quality of life.     Plan of care discussed with patient: Yes  Patient's spiritual, cultural and educational needs considered and patient is agreeable to the plan of care and goals as stated below:     Anticipated Barriers for therapy: none    Medical Necessity is demonstrated by the following  History  Co-morbidities and personal factors that may impact the plan of care Co-morbidities:   asthma    Personal Factors:   age     low   Examination  Body Structures and Functions, activity limitations and participation restrictions that may impact the plan of care Body Regions:   back    Body Systems:    ROM  strength  motor control  posture    Participation Restrictions:   Bending, household chores    Activity limitations:   Learning and applying knowledge  no deficits    General Tasks and Commands  no deficits    Communication  no deficits    Mobility  lifting and carrying objects    Self care  no deficits    Domestic Life  doing house work (cleaning house, washing dishes,  laundry)    Interactions/Relationships  no deficits    Life Areas  no deficits    Community and Social Life  no deficits         low   Clinical Presentation stable and uncomplicated low   Decision Making/ Complexity Score: low     Goals:    SHORT TERM GOALS:  4 weeks 5/24/2023   Recent signs and systems trend is improving in order to progress towards LTG's.    Patient will be independent with HEP in order to further progress and return to maximal function.    Pain rating at Worst: 5/10 in order to progress towards increased independence with activity.    Patient will be able to correct postural deviations in sitting and standing, to decrease pain and promote postural awareness for injury prevention.       LONG TERM GOALS: 8 weeks 5/24/2023   Patient will return to normal ADL, recreational, and work related activities with less pain and limitation.     Patient will improve AROM to stated goals in order to return to maximal functional potential.     Patient will improve Strength to stated goals of appropriate musculature in order to improve functional independence.     Pain Rating at Worst: 2/10 to improve Quality of Life.     Patient will meet predicted functional outcome (FOTO) score: 15% limitation or less to increase self-worth & perceived functional ability.    Patient will have met personal goal of: performing household chores without increase in back symptoms.         PLAN   Plan of care Certification: 5/24/2023 to 8/24/2023.    Outpatient Physical Therapy 1-2 times weekly for 6-8 weeks to include the following interventions: Manual Therapy, Moist Heat/ Ice, Neuromuscular Re-ed, Patient Education, Therapeutic Activities, Therapeutic Exercise, and Modalities.     La Nena Whitehead, PT      I CERTIFY THE NEED FOR THESE SERVICES FURNISHED UNDER THIS PLAN OF TREATMENT AND WHILE UNDER MY CARE   Physician's comments:     Physician's Signature: ___________________________________________________

## 2023-06-06 ENCOUNTER — TELEPHONE (OUTPATIENT)
Dept: REHABILITATION | Facility: HOSPITAL | Age: 58
End: 2023-06-06
Payer: COMMERCIAL

## 2023-06-06 NOTE — TELEPHONE ENCOUNTER
Spoke to MsNando Justus who says she forgot about scheduled visits on Thursday, 6/1, and today 6/6.  Offered patient opportunity to come to 4:45 visit this afternoon but patient declined due to having dog with her while out doing errands and not being able to bring dog into therapy clinic.  Reminded patient of next scheduled visit on 6/8 at 4:00.

## 2023-06-13 ENCOUNTER — CLINICAL SUPPORT (OUTPATIENT)
Dept: REHABILITATION | Facility: HOSPITAL | Age: 58
End: 2023-06-13
Payer: COMMERCIAL

## 2023-06-13 DIAGNOSIS — M25.69 DECREASED RANGE OF MOTION OF TRUNK AND BACK: Primary | ICD-10-CM

## 2023-06-13 DIAGNOSIS — R29.898 WEAKNESS OF BOTH HIPS: ICD-10-CM

## 2023-06-13 PROCEDURE — 97110 THERAPEUTIC EXERCISES: CPT

## 2023-06-13 PROCEDURE — 97530 THERAPEUTIC ACTIVITIES: CPT

## 2023-06-13 NOTE — PROGRESS NOTES
"  [facility]   Physical Therapy Treatment Note      Name: Zaynab Coyne [facility]  Clinic Number: [id number]    Therapy Diagnosis:   Encounter Diagnoses   Name Primary?    Decreased range of motion of trunk and back Yes    Weakness of both hips      Physician: Isela Rogers MD    Visit Date: 6/13/2023    Physician Orders: PT Eval and Treat   Medical Diagnosis from Referral: M62.838 (ICD-10-CM) - Muscle spasm  Evaluation Date: 5/24/2023  Authorization Period Expiration: 12/31/2023  Plan of Care Expiration: 8/24/2023  Progress Note Due: 6/24/2023  Visit # / Visits authorized: 2/ 15   FOTO: 1/ 3     PTA Visit #: 0/5     Time In: 4:35 PM  Time Out: 5:15 PM  Total Billable Time: 40 minutes  (2 TE, 1TA)    Precautions: Standard, Asthma    Subjective     Pt reports: minimal back pain today.    She was compliant with home exercise program.  Response to previous treatment: no adverse response  Functional change: none reported, ongoing    Pain: 3/10 at start of session, 1/10 at end of session  Location: left low back and bilateral hips    Objective      Objective Measures updated at progress report unless specified.     Treatment     Zaynab received the treatments listed below:      therapeutic exercises to develop strength and flexibility for 25 minutes including:    - hip flexor/quad stretch with strap off edge of mat 20" x 5  - lower trunk rotation stretch 10" x 10 bilaterally  - bridges with hip abduction with red band 2 x 10 reps  - sidelying clams with red band 2 x 12 reps  - straight leg raise with 2# 2 x 15 reps bilaterally  - prone hip extension 2# 2 x 15 reps bilaterally (emphasis on avoiding excessive lumbar arch)  - prone hip extension 2 x 10 reps  - quadruped cat/cow x 10 cycles      neuromuscular re-education activities to improve: Proprioception and Posture for 15 minutes. The following activities were included:  - posterior pelvic tilts 3" 2 x 10  - isometric hip adduction + transverse " "abdominal / PGM contraction 3" x 20  - isometric transverse abdominal contraction + alternating hooklying march 2 x 10 reps      Patient Education and Home Exercises       Education provided:   - purpose of exercises performed    Written Home Exercises Provided: Patient instructed to cont prior HEP. Exercises were reviewed and Zaynab was able to demonstrate them prior to the end of the session.  Zaynab demonstrated good  understanding of the education provided. See EMR under Patient Instructions for exercises provided during therapy sessions    Assessment     Zaynab tolerated session well, able to reduce left sided low back pain when focused on maintaining neutral spine / preventing excessive lordosis during all exercises.  Pt demonstrated fair transverse abdominal contraction with tactile cuing. Subjective reduction in pain at end of session as compared to start of session.  Skilled PT remains appropriate to address impairments to met goals set at eval.     Zaynab Is progressing well towards her goals.   Pt prognosis is Good.     Pt will continue to benefit from skilled outpatient physical therapy to address the deficits listed in the problem list box on initial evaluation, provide pt/family education and to maximize pt's level of independence in the home and community environment.     Pt's spiritual, cultural and educational needs considered and pt agreeable to plan of care and goals.     Anticipated barriers to physical therapy: none    Goals:      SHORT TERM GOALS:  4 weeks 5/24/2023   Recent signs and systems trend is improving in order to progress towards LTG's. progressing    Patient will be independent with HEP in order to further progress and return to maximal function. progressing     Pain rating at Worst: 5/10 in order to progress towards increased independence with activity. progressing     Patient will be able to correct postural deviations in sitting and standing, to decrease pain and promote " postural awareness for injury prevention.  progressing        LONG TERM GOALS: 8 weeks 5/24/2023   Patient will return to normal ADL, recreational, and work related activities with less pain and limitation.  progressing     Patient will improve AROM to stated goals in order to return to maximal functional potential.  progressing     Patient will improve Strength to stated goals of appropriate musculature in order to improve functional independence.  progressing     Pain Rating at Worst: 2/10 to improve Quality of Life.   progressing    Patient will meet predicted functional outcome (FOTO) score: 15% limitation or less to increase self-worth & perceived functional ability.  progressing    Patient will have met personal goal of: performing household chores without increase in back symptoms.   progressing         Plan     Progress core stabilization exercise as tolerated.    Venessa Brown, PT

## 2023-06-14 NOTE — PROGRESS NOTES
"  [facility]   Physical Therapy Treatment Note      Name: Zaynab Coyne [facility]  Clinic Number: [id number]    Therapy Diagnosis:   Encounter Diagnoses   Name Primary?    Decreased range of motion of trunk and back Yes    Weakness of both hips        Physician: Isela Rogers MD    Visit Date: 6/15/2023    Physician Orders: PT Eval and Treat   Medical Diagnosis from Referral: M62.838 (ICD-10-CM) - Muscle spasm  Evaluation Date: 5/24/2023  Authorization Period Expiration: 12/31/2023  Plan of Care Expiration: 8/24/2023  Progress Note Due: 6/24/2023  Visit # / Visits authorized: 3/ 15   FOTO: 1/ 3     PTA Visit #: 1/5     Time In: 9:40 AM  Time Out: 10:10 AM  Total Billable Time: 30 minutes  (1 TE, 1TA)    Precautions: Standard, Asthma    Subjective     Pt reports: minimal back pain today.    She was compliant with home exercise program.  Response to previous treatment: no adverse response  Functional change: none reported, ongoing    Pain: 2/10 at start of session, 1/10 at end of session  Location: left low back and bilateral hips    Objective      Objective Measures updated at progress report unless specified.     Treatment     Zaynab received the treatments listed below:      therapeutic exercises to develop strength and flexibility for 25 minutes including:    - hip flexor/quad stretch with strap off edge of mat 20" x 5  - lower trunk rotation stretch 10" x 10 bilaterally  - bridges with hip abduction with red band 2 x 10 reps  - sidelying clams with red band 3x10 ea  - straight leg raise with 2# 2 x 15 reps bilaterally  - prone hip extension 2# 2 x 15 reps bilaterally (emphasis on avoiding excessive lumbar arch)  - quadruped cat/cow x 10 cycles  + sidelying hip abduction 2lbs 2x15 ea    neuromuscular re-education activities to improve: Proprioception and Posture for 15 minutes. The following activities were included:  - posterior pelvic tilts 3" 2 x 10  - isometric hip adduction + " "transverse abdominal / PGM contraction 3" x 20  - isometric transverse abdominal contraction + alternating hooklying march 2 x 10 reps      Patient Education and Home Exercises       Education provided:   - purpose of exercises performed    Written Home Exercises Provided: Patient instructed to cont prior HEP. Exercises were reviewed and Zaynab was able to demonstrate them prior to the end of the session.  Zaynab demonstrated good  understanding of the education provided. See EMR under Patient Instructions for exercises provided during therapy sessions    Assessment     Pt was able to complete therex today without reports of increased or reproduced pain throughout or after treatment. Pt did have some muscle fatigue throughout treatment, however, with rest breaks pt able to complete tasks. Pt with some discomfort throughout right hip with supine hip flexor and quad stretch, however, pt able to complete and tolerate full duration of stretching. Pt required mod A throughout cat cow stretch due to pt bending elbows and not performing cat stretch properly, however, after verbal cues pt able to complete task properly. PTA discussed with pt her dizziness after last PT treatment, however, pt did informed PTA pt didn't eat before attending PT treatment.     Zaynab Is progressing well towards her goals.   Pt prognosis is Good.     Pt will continue to benefit from skilled outpatient physical therapy to address the deficits listed in the problem list box on initial evaluation, provide pt/family education and to maximize pt's level of independence in the home and community environment.     Pt's spiritual, cultural and educational needs considered and pt agreeable to plan of care and goals.     Anticipated barriers to physical therapy: none    Goals:      SHORT TERM GOALS:  4 weeks 5/24/2023   Recent signs and systems trend is improving in order to progress towards LTG's. progressing    Patient will be independent with HEP in " order to further progress and return to maximal function. progressing     Pain rating at Worst: 5/10 in order to progress towards increased independence with activity. progressing     Patient will be able to correct postural deviations in sitting and standing, to decrease pain and promote postural awareness for injury prevention.  progressing        LONG TERM GOALS: 8 weeks 5/24/2023   Patient will return to normal ADL, recreational, and work related activities with less pain and limitation.  progressing     Patient will improve AROM to stated goals in order to return to maximal functional potential.  progressing     Patient will improve Strength to stated goals of appropriate musculature in order to improve functional independence.  progressing     Pain Rating at Worst: 2/10 to improve Quality of Life.   progressing    Patient will meet predicted functional outcome (FOTO) score: 15% limitation or less to increase self-worth & perceived functional ability.  progressing    Patient will have met personal goal of: performing household chores without increase in back symptoms.   progressing         Plan     Progress core stabilization exercise as tolerated.    Volodymyr Krishna, PTA

## 2023-06-15 ENCOUNTER — CLINICAL SUPPORT (OUTPATIENT)
Dept: REHABILITATION | Facility: HOSPITAL | Age: 58
End: 2023-06-15
Payer: COMMERCIAL

## 2023-06-15 DIAGNOSIS — M25.69 DECREASED RANGE OF MOTION OF TRUNK AND BACK: Primary | ICD-10-CM

## 2023-06-15 DIAGNOSIS — R29.898 WEAKNESS OF BOTH HIPS: ICD-10-CM

## 2023-06-15 PROCEDURE — 97530 THERAPEUTIC ACTIVITIES: CPT | Mod: CQ

## 2023-06-15 PROCEDURE — 97110 THERAPEUTIC EXERCISES: CPT | Mod: CQ

## 2024-12-17 ENCOUNTER — HOSPITAL ENCOUNTER (EMERGENCY)
Facility: HOSPITAL | Age: 59
Discharge: HOME | End: 2024-12-17
Attending: EMERGENCY MEDICINE
Payer: COMMERCIAL

## 2024-12-17 VITALS
BODY MASS INDEX: 24.07 KG/M2 | HEIGHT: 64 IN | HEART RATE: 74 BPM | DIASTOLIC BLOOD PRESSURE: 63 MMHG | WEIGHT: 141 LBS | SYSTOLIC BLOOD PRESSURE: 136 MMHG | OXYGEN SATURATION: 99 % | RESPIRATION RATE: 16 BRPM | TEMPERATURE: 98 F

## 2024-12-17 DIAGNOSIS — M25.562 KNEE PAIN, BILATERAL: ICD-10-CM

## 2024-12-17 DIAGNOSIS — M25.561 KNEE PAIN, BILATERAL: ICD-10-CM

## 2024-12-17 DIAGNOSIS — S80.02XA CONTUSION OF LEFT KNEE, INITIAL ENCOUNTER: Primary | ICD-10-CM

## 2024-12-17 DIAGNOSIS — S69.92XA WRIST INJURY, LEFT, INITIAL ENCOUNTER: ICD-10-CM

## 2024-12-17 PROCEDURE — 99284 EMERGENCY DEPT VISIT MOD MDM: CPT | Mod: 25

## 2024-12-17 RX ORDER — NAPROXEN 500 MG/1
500 TABLET ORAL 2 TIMES DAILY
Qty: 14 TABLET | Refills: 0 | Status: SHIPPED | OUTPATIENT
Start: 2024-12-17 | End: 2024-12-24

## 2024-12-17 NOTE — ED NOTES
Patient fell in [place].  Patient is unsure of how she fell.  Patient states she fell face first and landed on bilateral knees and left wrist.  Patient rates pain 9/10.  Patient denies hitting her head or any LOC. Patient has a abrasion to left knee and right knee appears swollen.

## 2024-12-17 NOTE — ED TRIAGE NOTES
Pt reports she tripped and fell while in a store falling onto both of her knees. She denies hitting her head. She is having pain to chelsea knees and left wrist.    no ROM deficits were identified additional location...

## 2024-12-17 NOTE — ED PROVIDER NOTES
Encounter Date: 12/17/2024       History     Chief Complaint   Patient presents with    Fall     Patient reports to the ED complaining of bilateral knee pain after a mechanical fall at the store. Patient states tripped forward when she landed on her knees and wrist. Patient left knee visibly swollen. Ambulatory with a limp.     Patient is a 59-year-old female with a past medical history of anxiety, endocarditis, and asthma who presents to emergency room for bilateral knee pain and left wrist pain after mechanical fall that occurred while she was at [employer].  Patient does not remember how she fell, but she remembers tripping forwards and falling on her bilateral knees and wrists.  No head injury or loss of consciousness.  No headache.  Patient denies any weakness, numbness, or tingling.  Swelling to left knee.  She reports that the pain radiates up her leg.  No back pain.  No urinary incontinence.  No medications taken prior to arrival.    The history is provided by the patient. No  was used.     Review of patient's allergies indicates:  No Known Allergies  Past Medical History:   Diagnosis Date    Anxiety state, unspecified     Endocarditis, valve unspecified, unspecified cause     Unspecified asthma(493.90)      Past Surgical History:   Procedure Laterality Date    HYSTERECTOMY  2005    partial     No family history on file.  Social History     Tobacco Use    Smoking status: Never     Passive exposure: Never    Smokeless tobacco: Never   Substance Use Topics    Alcohol use: Yes     Comment: socially     Review of Systems   Constitutional:  Negative for chills, diaphoresis, fatigue and fever.   HENT:  Negative for congestion, sore throat and trouble swallowing.    Respiratory:  Negative for cough and shortness of breath.    Cardiovascular:  Negative for chest pain and palpitations.   Gastrointestinal:  Negative for abdominal pain, blood in stool, constipation, diarrhea, nausea and vomiting.    Genitourinary:  Negative for difficulty urinating, dysuria, frequency and urgency.   Musculoskeletal:  Positive for arthralgias (bilateral knee and wrist). Negative for back pain and myalgias.   Skin:  Negative for rash and wound.   Neurological:  Negative for weakness, light-headedness, numbness and headaches.       Physical Exam     Initial Vitals [12/17/24 1627]   BP Pulse Resp Temp SpO2   136/63 74 16 97.9 °F (36.6 °C) 99 %      MAP       --         Physical Exam    Nursing note and vitals reviewed.  Constitutional: She appears well-developed and well-nourished. She is not diaphoretic. No distress.   Patient well-appearing.  Awake and alert.  No acute distress.  Maintaining airway appropriately.  Speaking in complete sentences.   HENT:   Head: Normocephalic and atraumatic.   Right Ear: External ear normal.   Left Ear: External ear normal.   Eyes: Conjunctivae and EOM are normal.   Neck: Neck supple.   Normal range of motion.  Pulmonary/Chest: No respiratory distress.   Musculoskeletal:         General: No edema.      Left wrist: Tenderness present. No swelling or deformity. Normal range of motion.      Cervical back: Normal range of motion and neck supple.      Right knee: Swelling present. Decreased range of motion. Tenderness present.      Left knee: Swelling present. Decreased range of motion. Tenderness present.        Legs:       Comments: Posterior tibialis pulses 2+ bilaterally.  Radial pulses 2+ bilaterally.     Neurological: She is alert and oriented to person, place, and time. She has normal strength.   Skin: Skin is warm.   Psychiatric: She has a normal mood and affect. Her behavior is normal. Thought content normal.         ED Course   Procedures  Labs Reviewed - No data to display       Imaging Results              X-Ray Wrist Complete Left (Final result)  Result time 12/17/24 17:55:03      Final result by Pablo Watkins MD (12/17/24 17:55:03)                   Impression:      1. No acute  displaced fracture or dislocation of the wrist.      Electronically signed by: Pablo Watkins MD  Date:    12/17/2024  Time:    17:55               Narrative:    EXAMINATION:  XR WRIST COMPLETE 3 VIEWS LEFT    CLINICAL HISTORY:  Unspecified injury of left wrist, hand and finger(s), initial encounter    TECHNIQUE:  PA, lateral, and oblique views of the left wrist were performed.    COMPARISON:  None    FINDINGS:  Three views left wrist.    There is osteopenia.  No acute displaced fracture or dislocation of the wrist.  No radiopaque foreign body.  No significant edema.                                       X-Ray Knee 3 View Bilateral (Final result)  Result time 12/17/24 17:55:45      Final result by Pablo Watkins MD (12/17/24 17:55:45)                   Impression:      1. No acute displaced fracture or dislocation of the left or right knee.      Electronically signed by: Pablo Watkins MD  Date:    12/17/2024  Time:    17:55               Narrative:    EXAMINATION:  XR KNEE 3 VIEW BILATERAL    CLINICAL HISTORY:  Pain in right knee    TECHNIQUE:  AP, lateral, and Merchant views of both knees were performed.    COMPARISON:  None    FINDINGS:  Four views bilateral knees.    There is bilateral medial compartmental narrowing.  No acute displaced fracture or dislocation of the left or right knee.  No radiopaque foreign body.  No large knee joint effusion.                                       Medications - No data to display  Medical Decision Making  Patient presents to emergency room for bilateral knee and wrist pain after fall.  Vital signs stable and within normal limits.  Physical exam as stated above.    Differential Diagnosis includes, but is not limited to fracture, dislocation, nerve injury/palsy, vascular injury, DVT, septic joint, cellulitis, bursitis, muscle strain, ligament tear/sprain, laceration, foreign body, abrasion, soft tissue contusion, osteoarthritis, or gout.  I do not suspect nerve or  vascular injury, as sensation and pulses intact.  No significant extremity edema that would suggest DVT.  Patient with adequate range of motion.  Unlikely septic joint.  No evidence of laceration or abrasion on physical exam.  X-ray without evidence of fracture or dislocation. Clinical presentation and physical exam most suggestive of contusion to bilateral knees. Will prescribe naproxen to use upon discharge.  Discussed conservative management such as RICE therapy in addition to stretching.  Advised on over-the-counter analgesic medications such as lidocaine patches, ibuprofen, Tylenol, and icy hot.    I see no indication of an emergent process beyond that addressed during our encounter. Patient stable for discharge at this time. I have counseled the patient regarding follow up with PCP and gave strict return precautions. I have discussed the final diagnosis and gave instructions regarding prescribed and over-the-counter medications. Patient verbalized understanding and is agreeable.     Problems Addressed:  Contusion of left knee, initial encounter: acute illness or injury  Knee pain, bilateral: acute illness or injury  Wrist injury, left, initial encounter: acute illness or injury    Amount and/or Complexity of Data Reviewed  External Data Reviewed: notes.     Details: Patient sees outside physician for primary care.  Last seen in 12/2023.  Radiology: ordered. Decision-making details documented in ED Course.    Risk  OTC drugs.  Prescription drug management.  Risk Details: Comorbidities taken into consideration during the patient's evaluation and treatment include anxiety, endocarditis, and asthma.    Social determinants of health taken into consideration during development of our treatment plan include difficulty in obtaining follow-up, obtaining medications, health literacy, access to healthy options for preventative/conservative management, and/or support systems due to, but not limited to, transportation  limitations, socioeconomic status, and environmental factors.                ED Course as of 12/17/24 2151   Tue Dec 17, 2024   1802 X-Ray Knee 3 View Bilateral  Independently reviewed and agree with radiology reading:  No fracture or dislocation. [BJ]   1802 X-Ray Wrist Complete Left  Independently reviewed and agree with radiology reading:  No fracture or dislocation. [BJ]      ED Course User Index  [BJ] Beatrice Morataya PA-C                           Clinical Impression:  Final diagnoses:  [M25.561, M25.562] Knee pain, bilateral  [S69.92XA] Wrist injury, left, initial encounter  [S80.02XA] Contusion of left knee, initial encounter (Primary)          ED Disposition Condition    Discharge Stable          ED Prescriptions       Medication Sig Dispense Start Date End Date Auth. Provider    naproxen (NAPROSYN) 500 MG tablet Take 1 tablet (500 mg total) by mouth 2 (two) times daily. for 7 days 14 tablet 12/17/2024 12/24/2024 Beatrice Morataya PA-C          Follow-up Information       Follow up With Specialties Details Why Contact Info    Reynaldo Thomas MD Internal Medicine, Family Medicine   [address] [place] [address]  [address] [place] [id number]  [phone]              This note was partially created using [employer] Voice Recognition software. Typographical and content errors may occur with this process. While efforts are made to detect and correct such errors, in some cases errors will persist. For this reason, wording in this document should be considered in the proper context and not strictly verbatim.        Beatrice Morataya PA-C  12/17/24 2151

## 2024-12-18 NOTE — DISCHARGE INSTRUCTIONS
You were seen here today for knee pain. I believe this is most likely due to a bruising to the area after an injury. You may use ice for extra relief.  If you are having swelling to the area, be sure to elevated above your heart for additional relief.  You may feel discomfort or soreness to the area for several days to a week.      I recommend over the counter Tylenol (Acetaminophen) and/or Motrin (Ibuprofen) for additional control of pain. You can stagger the dosing so you are taking one or the other every three hours while spacing out the Tylenol and every 6 hours and the Motrin every 6 hours. HOWEVER, if you are taking another NSAID such as Ibuprofen, Meloxicam, Toradol, Celebrex, or others, DO NOT take Ibuprofen at the same time.     See discharge paperwork for more information on your diagnosis.  If you start to have any new or worsening symptoms, please return to the emergency room.    Thank you for allowing me and my emergency team to take care of you here today! I hope you feel better soon. Please do not hesitate to return with any additional concerns that may arise from this or any new problem you encounter.    Our goal in the emergency department is to always give you outstanding care and exceptional service. If you receive a survey by mail or e-mail in the next week regarding your experience in our ED, we would greatly appreciate you completing it. Your feedback provides us with a way to recognize our staff who give very good care and it helps us learn how to improve when your experience was below the excellence we aspire to be!    Brook [place], PA-C [facility]   Emergency Room Physician Assistant